# Patient Record
Sex: FEMALE | Race: WHITE | NOT HISPANIC OR LATINO | Employment: FULL TIME | URBAN - NONMETROPOLITAN AREA
[De-identification: names, ages, dates, MRNs, and addresses within clinical notes are randomized per-mention and may not be internally consistent; named-entity substitution may affect disease eponyms.]

---

## 2020-01-14 ENCOUNTER — TRANSCRIBE ORDERS (OUTPATIENT)
Dept: ADMINISTRATIVE | Facility: HOSPITAL | Age: 45
End: 2020-01-14

## 2020-01-14 ENCOUNTER — HOSPITAL ENCOUNTER (OUTPATIENT)
Dept: RADIOLOGY | Facility: HOSPITAL | Age: 45
Discharge: HOME | End: 2020-01-14
Payer: COMMERCIAL

## 2020-01-14 DIAGNOSIS — M25.552 PAIN IN LEFT HIP: Primary | ICD-10-CM

## 2020-01-14 DIAGNOSIS — M25.552 PAIN IN LEFT HIP: ICD-10-CM

## 2020-01-14 PROCEDURE — 73502 X-RAY EXAM HIP UNI 2-3 VIEWS: CPT

## 2020-08-06 ENCOUNTER — TRANSCRIBE ORDERS (OUTPATIENT)
Dept: ADMINISTRATIVE | Facility: HOSPITAL | Age: 45
End: 2020-08-06

## 2020-08-06 DIAGNOSIS — Z12.31 SCREENING MAMMOGRAM, ENCOUNTER FOR: Primary | ICD-10-CM

## 2020-08-20 ENCOUNTER — HOSPITAL ENCOUNTER (OUTPATIENT)
Dept: RADIOLOGY | Facility: CLINIC | Age: 45
Discharge: HOME | End: 2020-08-20
Payer: COMMERCIAL

## 2020-08-20 VITALS — BODY MASS INDEX: 34.55 KG/M2 | HEIGHT: 66 IN | WEIGHT: 215 LBS

## 2020-08-20 DIAGNOSIS — Z12.31 SCREENING MAMMOGRAM FOR HIGH-RISK PATIENT: ICD-10-CM

## 2020-08-20 DIAGNOSIS — Z12.31 SCREENING MAMMOGRAM, ENCOUNTER FOR: ICD-10-CM

## 2020-08-20 PROCEDURE — 77063 BREAST TOMOSYNTHESIS BI: CPT

## 2020-08-20 PROCEDURE — 77067 SCR MAMMO BI INCL CAD: CPT

## 2021-03-16 ENCOUNTER — TRANSCRIBE ORDERS (OUTPATIENT)
Dept: ADMINISTRATIVE | Facility: HOSPITAL | Age: 46
End: 2021-03-16

## 2021-03-16 ENCOUNTER — APPOINTMENT (OUTPATIENT)
Dept: LAB | Facility: HOSPITAL | Age: 46
End: 2021-03-16
Payer: COMMERCIAL

## 2021-03-16 DIAGNOSIS — R76.8 OTHER SPECIFIED ABNORMAL IMMUNOLOGICAL FINDINGS IN SERUM: ICD-10-CM

## 2021-03-16 DIAGNOSIS — R76.8 OTHER SPECIFIED ABNORMAL IMMUNOLOGICAL FINDINGS IN SERUM: Primary | ICD-10-CM

## 2021-03-16 LAB
ALT SERPL W P-5'-P-CCNC: 21 U/L (ref 12–78)
AST SERPL W P-5'-P-CCNC: 12 U/L (ref 5–45)
BACTERIA UR QL AUTO: ABNORMAL /HPF
BASOPHILS # BLD AUTO: 0.01 THOUSANDS/ΜL (ref 0–0.1)
BASOPHILS NFR BLD AUTO: 0 % (ref 0–1)
BILIRUB UR QL STRIP: NEGATIVE
CLARITY UR: CLEAR
COLOR UR: YELLOW
CREAT SERPL-MCNC: 0.9 MG/DL (ref 0.6–1.3)
CRP SERPL QL: 8.1 MG/L
EOSINOPHIL # BLD AUTO: 0.27 THOUSAND/ΜL (ref 0–0.61)
EOSINOPHIL NFR BLD AUTO: 4 % (ref 0–6)
ERYTHROCYTE [DISTWIDTH] IN BLOOD BY AUTOMATED COUNT: 14 % (ref 11.6–15.1)
ERYTHROCYTE [SEDIMENTATION RATE] IN BLOOD: 32 MM/HOUR (ref 0–19)
GFR SERPL CREATININE-BSD FRML MDRD: 76 ML/MIN/1.73SQ M
GLUCOSE UR STRIP-MCNC: NEGATIVE MG/DL
HCT VFR BLD AUTO: 36.5 % (ref 34.8–46.1)
HGB BLD-MCNC: 11.8 G/DL (ref 11.5–15.4)
HGB UR QL STRIP.AUTO: NEGATIVE
IMM GRANULOCYTES # BLD AUTO: 0.01 THOUSAND/UL (ref 0–0.2)
IMM GRANULOCYTES NFR BLD AUTO: 0 % (ref 0–2)
KETONES UR STRIP-MCNC: NEGATIVE MG/DL
LEUKOCYTE ESTERASE UR QL STRIP: NEGATIVE
LYMPHOCYTES # BLD AUTO: 2.02 THOUSANDS/ΜL (ref 0.6–4.47)
LYMPHOCYTES NFR BLD AUTO: 33 % (ref 14–44)
MCH RBC QN AUTO: 28.7 PG (ref 26.8–34.3)
MCHC RBC AUTO-ENTMCNC: 32.3 G/DL (ref 31.4–37.4)
MCV RBC AUTO: 89 FL (ref 82–98)
MONOCYTES # BLD AUTO: 0.38 THOUSAND/ΜL (ref 0.17–1.22)
MONOCYTES NFR BLD AUTO: 6 % (ref 4–12)
NEUTROPHILS # BLD AUTO: 3.52 THOUSANDS/ΜL (ref 1.85–7.62)
NEUTS SEG NFR BLD AUTO: 57 % (ref 43–75)
NITRITE UR QL STRIP: NEGATIVE
NON-SQ EPI CELLS URNS QL MICRO: ABNORMAL /HPF
NRBC BLD AUTO-RTO: 0 /100 WBCS
PH UR STRIP.AUTO: 5 [PH]
PLATELET # BLD AUTO: 320 THOUSANDS/UL (ref 149–390)
PMV BLD AUTO: 10.6 FL (ref 8.9–12.7)
PROT UR STRIP-MCNC: NEGATIVE MG/DL
RBC # BLD AUTO: 4.11 MILLION/UL (ref 3.81–5.12)
RBC #/AREA URNS AUTO: ABNORMAL /HPF
SP GR UR STRIP.AUTO: 1.02 (ref 1–1.03)
UROBILINOGEN UR QL STRIP.AUTO: 0.2 E.U./DL
WBC # BLD AUTO: 6.21 THOUSAND/UL (ref 4.31–10.16)
WBC #/AREA URNS AUTO: ABNORMAL /HPF

## 2021-03-16 PROCEDURE — 85732 THROMBOPLASTIN TIME PARTIAL: CPT

## 2021-03-16 PROCEDURE — 86235 NUCLEAR ANTIGEN ANTIBODY: CPT

## 2021-03-16 PROCEDURE — 81001 URINALYSIS AUTO W/SCOPE: CPT

## 2021-03-16 PROCEDURE — 84460 ALANINE AMINO (ALT) (SGPT): CPT

## 2021-03-16 PROCEDURE — 86140 C-REACTIVE PROTEIN: CPT

## 2021-03-16 PROCEDURE — 84450 TRANSFERASE (AST) (SGOT): CPT

## 2021-03-16 PROCEDURE — 86038 ANTINUCLEAR ANTIBODIES: CPT

## 2021-03-16 PROCEDURE — 85613 RUSSELL VIPER VENOM DILUTED: CPT

## 2021-03-16 PROCEDURE — 86146 BETA-2 GLYCOPROTEIN ANTIBODY: CPT

## 2021-03-16 PROCEDURE — 86225 DNA ANTIBODY NATIVE: CPT

## 2021-03-16 PROCEDURE — 86147 CARDIOLIPIN ANTIBODY EA IG: CPT

## 2021-03-16 PROCEDURE — 36415 COLL VENOUS BLD VENIPUNCTURE: CPT

## 2021-03-16 PROCEDURE — 86800 THYROGLOBULIN ANTIBODY: CPT

## 2021-03-16 PROCEDURE — 86039 ANTINUCLEAR ANTIBODIES (ANA): CPT

## 2021-03-16 PROCEDURE — 86226 DNA ANTIBODY SINGLE STRAND: CPT

## 2021-03-16 PROCEDURE — 85025 COMPLETE CBC W/AUTO DIFF WBC: CPT

## 2021-03-16 PROCEDURE — 85652 RBC SED RATE AUTOMATED: CPT

## 2021-03-16 PROCEDURE — 82565 ASSAY OF CREATININE: CPT

## 2021-03-16 PROCEDURE — 86376 MICROSOMAL ANTIBODY EACH: CPT

## 2021-03-16 PROCEDURE — 85670 THROMBIN TIME PLASMA: CPT

## 2021-03-16 PROCEDURE — 85705 THROMBOPLASTIN INHIBITION: CPT

## 2021-03-17 LAB
ANA HOMOGEN SER QL IF: NORMAL
ANA HOMOGEN TITR SER: NORMAL {TITER}
B2 GLYCOPROT1 IGA SERPL IA-ACNC: 3.4
B2 GLYCOPROT1 IGG SERPL IA-ACNC: 1.6
B2 GLYCOPROT1 IGM SERPL IA-ACNC: <2.9
CARDIOLIPIN IGA SER IA-ACNC: 9.1
CARDIOLIPIN IGG SER IA-ACNC: 7.2
CARDIOLIPIN IGM SER IA-ACNC: 2.8
CENTROMERE B AB SER-ACNC: <0.2 AI (ref 0–0.9)
DSDNA AB SER-ACNC: 1 IU/ML (ref 0–9)
ENA RNP AB SER-ACNC: <0.2 AI (ref 0–0.9)
ENA SCL70 AB SER-ACNC: <0.2 AI (ref 0–0.9)
ENA SM AB SER-ACNC: <0.2 AI (ref 0–0.9)
ENA SS-A AB SER-ACNC: <0.2 AI (ref 0–0.9)
ENA SS-B AB SER-ACNC: <0.2 AI (ref 0–0.9)
RYE IGE QN: POSITIVE
THYROGLOB AB SERPL-ACNC: <1 IU/ML (ref 0–0.9)
THYROPEROXIDASE AB SERPL-ACNC: <9 IU/ML (ref 0–34)

## 2021-03-18 LAB — HISTONE IGG SER IA-ACNC: 1.4 UNITS (ref 0–0.9)

## 2021-03-19 LAB
APTT SCREEN TO CONFIRM RATIO: 1.17 RATIO (ref 0–1.4)
CONFIRM APTT/NORMAL: 35.8 SEC (ref 0–55)
LA PPP-IMP: NORMAL
SCREEN APTT: 38.2 SEC (ref 0–51.9)
SCREEN DRVVT: 39.9 SEC (ref 0–47)
SSDNA IGG SER-ACNC: 27 EU (ref 0–19)
THROMBIN TIME: 17 SEC (ref 0–23)

## 2021-03-24 LAB — MISCELLANEOUS LAB TEST RESULT: NORMAL

## 2021-04-08 DIAGNOSIS — Z23 ENCOUNTER FOR IMMUNIZATION: ICD-10-CM

## 2022-01-06 ENCOUNTER — HOSPITAL ENCOUNTER (OUTPATIENT)
Dept: RADIOLOGY | Facility: CLINIC | Age: 47
Discharge: HOME | End: 2022-01-06
Payer: COMMERCIAL

## 2022-01-06 VITALS — BODY MASS INDEX: 36.32 KG/M2 | WEIGHT: 226 LBS | HEIGHT: 66 IN

## 2022-01-06 DIAGNOSIS — Z12.31 ENCOUNTER FOR SCREENING MAMMOGRAM FOR MALIGNANT NEOPLASM OF BREAST: ICD-10-CM

## 2022-01-06 PROCEDURE — 77063 BREAST TOMOSYNTHESIS BI: CPT

## 2022-01-06 PROCEDURE — 77067 SCR MAMMO BI INCL CAD: CPT

## 2022-01-26 ENCOUNTER — HOSPITAL ENCOUNTER (OUTPATIENT)
Dept: RADIOLOGY | Facility: CLINIC | Age: 47
Discharge: HOME | End: 2022-01-26
Payer: COMMERCIAL

## 2022-01-26 VITALS — WEIGHT: 226 LBS | BODY MASS INDEX: 36.32 KG/M2 | HEIGHT: 66 IN

## 2022-01-26 DIAGNOSIS — R92.8 ABNORMAL SCREENING MAMMOGRAM: ICD-10-CM

## 2022-01-26 PROCEDURE — 76642 ULTRASOUND BREAST LIMITED: CPT

## 2022-01-26 PROCEDURE — 77065 DX MAMMO INCL CAD UNI: CPT

## 2022-01-26 PROCEDURE — G0279 TOMOSYNTHESIS, MAMMO: HCPCS

## 2022-07-27 ENCOUNTER — HOSPITAL ENCOUNTER (OUTPATIENT)
Dept: RADIOLOGY | Facility: CLINIC | Age: 47
Discharge: HOME | End: 2022-07-27
Payer: COMMERCIAL

## 2022-07-27 VITALS — BODY MASS INDEX: 36.32 KG/M2 | HEIGHT: 66 IN | WEIGHT: 226 LBS

## 2022-07-27 DIAGNOSIS — R92.8 ABNORMAL FINDINGS ON DIAGNOSTIC IMAGING OF BREAST: ICD-10-CM

## 2022-07-27 PROCEDURE — G0279 TOMOSYNTHESIS, MAMMO: HCPCS

## 2022-07-27 PROCEDURE — 76642 ULTRASOUND BREAST LIMITED: CPT

## 2022-07-27 PROCEDURE — 77065 DX MAMMO INCL CAD UNI: CPT

## 2023-01-25 ENCOUNTER — HOSPITAL ENCOUNTER (OUTPATIENT)
Dept: RADIOLOGY | Facility: CLINIC | Age: 48
Discharge: HOME | End: 2023-01-25

## 2023-01-25 VITALS — WEIGHT: 226 LBS | HEIGHT: 66 IN | BODY MASS INDEX: 36.32 KG/M2

## 2023-01-25 DIAGNOSIS — M75.51 BURSITIS OF RIGHT SHOULDER: ICD-10-CM

## 2023-01-25 DIAGNOSIS — R92.8 ABNORMAL MAMMOGRAM: ICD-10-CM

## 2023-03-29 ENCOUNTER — HOSPITAL ENCOUNTER (OUTPATIENT)
Dept: ULTRASOUND IMAGING | Facility: HOSPITAL | Age: 48
Discharge: HOME | End: 2023-03-29
Attending: OBSTETRICS & GYNECOLOGY

## 2023-03-29 DIAGNOSIS — N93.9 ABNORMAL UTERINE AND VAGINAL BLEEDING, UNSPECIFIED: ICD-10-CM

## 2023-05-30 ENCOUNTER — APPOINTMENT (OUTPATIENT)
Dept: LAB | Facility: HOSPITAL | Age: 48
End: 2023-05-30
Payer: COMMERCIAL

## 2023-05-30 DIAGNOSIS — Z01.818 PRE-OP EXAM: ICD-10-CM

## 2023-05-30 DIAGNOSIS — Z01.818 PRE-OP EXAM: Primary | ICD-10-CM

## 2023-05-30 LAB
ERYTHROCYTE [DISTWIDTH] IN BLOOD BY AUTOMATED COUNT: 15.7 % (ref 11.6–15.1)
HCT VFR BLD AUTO: 36.1 % (ref 34.8–46.1)
HGB BLD-MCNC: 11.1 G/DL (ref 11.5–15.4)
MCH RBC QN AUTO: 26.6 PG (ref 26.8–34.3)
MCHC RBC AUTO-ENTMCNC: 30.7 G/DL (ref 31.4–37.4)
MCV RBC AUTO: 86 FL (ref 82–98)
PLATELET # BLD AUTO: 396 THOUSANDS/UL (ref 149–390)
PMV BLD AUTO: 10.4 FL (ref 8.9–12.7)
RBC # BLD AUTO: 4.18 MILLION/UL (ref 3.81–5.12)
WBC # BLD AUTO: 8.63 THOUSAND/UL (ref 4.31–10.16)

## 2023-05-30 PROCEDURE — 85027 COMPLETE CBC AUTOMATED: CPT

## 2023-05-30 PROCEDURE — 36415 COLL VENOUS BLD VENIPUNCTURE: CPT

## 2023-05-30 RX ORDER — FLUTICASONE PROPIONATE 50 MCG
1 SPRAY, SUSPENSION (ML) NASAL 2 TIMES DAILY
COMMUNITY

## 2023-05-30 RX ORDER — FEXOFENADINE HCL 180 MG/1
180 TABLET ORAL DAILY
COMMUNITY

## 2023-05-30 RX ORDER — ASCORBIC ACID 500 MG
500 TABLET ORAL DAILY
COMMUNITY

## 2023-05-30 RX ORDER — LEVOTHYROXINE SODIUM 112 UG/1
112 TABLET ORAL DAILY
COMMUNITY

## 2023-05-30 RX ORDER — METOPROLOL SUCCINATE 50 MG/1
50 TABLET, EXTENDED RELEASE ORAL DAILY
COMMUNITY

## 2023-05-30 RX ORDER — ACETAMINOPHEN, ASPIRIN AND CAFFEINE 250; 250; 65 MG/1; MG/1; MG/1
1 TABLET, FILM COATED ORAL EVERY 6 HOURS PRN
COMMUNITY

## 2023-05-30 RX ORDER — DULOXETIN HYDROCHLORIDE 20 MG/1
20 CAPSULE, DELAYED RELEASE ORAL 2 TIMES DAILY
COMMUNITY

## 2023-05-30 RX ORDER — ALPRAZOLAM 0.25 MG/1
0.25 TABLET ORAL 2 TIMES DAILY PRN
COMMUNITY

## 2023-05-30 RX ORDER — DIPHENHYDRAMINE HCL 25 MG
25 CAPSULE ORAL EVERY 6 HOURS PRN
COMMUNITY

## 2023-05-30 RX ORDER — PRAVASTATIN SODIUM 10 MG
10 TABLET ORAL DAILY
COMMUNITY

## 2023-05-30 RX ORDER — OMEPRAZOLE 20 MG/1
20 CAPSULE, DELAYED RELEASE ORAL DAILY
COMMUNITY

## 2023-05-30 RX ORDER — ACETAMINOPHEN 500 MG
1000 TABLET ORAL EVERY 6 HOURS PRN
COMMUNITY

## 2023-05-30 NOTE — PRE-PROCEDURE INSTRUCTIONS
Pre-Surgery Instructions:   Medication Instructions   • acetaminophen (TYLENOL) 500 mg tablet Uses PRN- OK to take day of surgery   • ALPRAZolam (XANAX) 0 25 mg tablet Uses PRN- OK to take day of surgery   • ascorbic acid (VITAMIN C) 500 MG tablet Stop taking 5 days prior to surgery  • aspirin-acetaminophen-caffeine (EXCEDRIN MIGRAINE) 524-815-92 MG per tablet Stop taking 5 days prior to surgery  • Cholecalciferol (Vitamin D3) 125 MCG (5000 UT) TABS Stop taking 5 days prior to surgery  • Cyanocobalamin (VITAMIN B-12 PO) Stop taking 5 days prior to surgery  • diphenhydrAMINE (BENADRYL) 25 mg capsule Uses PRN- OK to take day of surgery   • DULoxetine (CYMBALTA) 20 mg capsule Take day of surgery  • fexofenadine (ALLEGRA) 180 MG tablet Take day of surgery  • fluticasone (FLONASE) 50 mcg/act nasal spray Take day of surgery  • levothyroxine 112 mcg tablet Take day of surgery  • metoprolol succinate (TOPROL-XL) 50 mg 24 hr tablet Take night before surgery   • Omega-3 Fatty Acids (FISH OIL PO) Stop taking 5 days prior to surgery  • omeprazole (PriLOSEC) 20 mg delayed release capsule Take night before surgery   • pravastatin (PRAVACHOL) 10 mg tablet Take night before surgery   See above      Haven Gutiérrez   Social History     Socioeconomic History   • Marital status: Single     Spouse name: Not on file   • Number of children: Not on file   • Years of education: Not on file   • Highest education level: Not on file   Occupational History   • Not on file   Tobacco Use   • Smoking status: Not on file   • Smokeless tobacco: Not on file   Substance and Sexual Activity   • Alcohol use: Not on file   • Drug use: Not on file   • Sexual activity: Not on file   Other Topics Concern   • Not on file   Social History Narrative   • Not on file     Social Determinants of Health     Financial Resource Strain: Not on file   Food Insecurity: Not on file   Transportation Needs: Not on file   Physical Activity: Not on file   Stress: Not on file   Social Connections: Not on file   Intimate Partner Violence: Not on file   Housing Stability: Not on file

## 2023-05-31 RX ORDER — ONDANSETRON 2 MG/ML
4 INJECTION INTRAMUSCULAR; INTRAVENOUS EVERY 6 HOURS PRN
Status: CANCELLED | OUTPATIENT
Start: 2023-05-31

## 2023-05-31 RX ORDER — IBUPROFEN 600 MG/1
600 TABLET ORAL EVERY 6 HOURS PRN
Status: CANCELLED | OUTPATIENT
Start: 2023-05-31

## 2023-05-31 RX ORDER — SODIUM CHLORIDE 9 MG/ML
125 INJECTION, SOLUTION INTRAVENOUS CONTINUOUS
Status: CANCELLED | OUTPATIENT
Start: 2023-05-31

## 2023-05-31 NOTE — H&P
HISTORY AND PHYSICAL       Subjective      Clemencia Gates is a 50year old female  Chief Complaint   Patient presents with   • Consultation            HPI:     This is a 57-year-old  presents the office today for a consultation visit  Patient is known to to [facility]  Patient had 1 year of menopause from  until   Patient presented today at her gyn office complaining of abdominal pain  Ultimately had hematometra , and drainage was done in the office  Biopsy unable to be obtained due to stenotic cervix     Per the patient, has had multiple abnormal Pap smears, and precervical cancer in the past   She has had multiple colposcopies, leaps, and laser ablation performed      Here for consultation because she needs a hysteroscopy, D&C for abnormal uterine bleeding and endometrial sampling        PMH:     No past medical history on file  No past surgical history on file     No family history on file             Current Outpatient Medications   Medication Sig Dispense Refill   • CELEBREX 200 MG OR CAPS 2 times daily 60 5   • ALPRAZolam 0 25 MG Oral Tablet (xaNAX) alprazolam 0 25 mg tablet       • Ascorbic Acid 500 MG Oral Tablet Chewable Take by mouth        • Celecoxib 100 MG Oral Capsule (CeleBREX)         • Cyanocobalamin ER 1000 MCG Oral Tablet Extended Release Take by mouth        • diphenhydrAMINE HCl 25 MG Oral Tablet         • DULoxetine HCl 20 MG Oral Capsule Delayed Release Particles (Cymbalta)         • Fexofenadine HCl 180 MG Oral Tablet (Allegra) Take 1 Tablet by mouth        • Fluticasone Propionate 50 MCG/ACT Nasal Suspension (Flonase) fluticasone propionate 50 mcg/actuation nasal spray,suspension   as directed       • Levothyroxine Sodium 112 MCG Oral Tablet (Levoxyl)         • Metoprolol Succinate ER 50 MG Oral Tablet Extended Release 24 Hour (toPROL XL)         • Omeprazole 20 MG Oral Capsule Delayed Release (PriLOSEC)         • Pravastatin Sodium 10 MG Oral Tablet "(Pravachol)            No current facility-administered medications for this visit                Review of patient's allergies indicates: Allergen Reactions   • Iodine Hives       Cat scan    • Penicillins           ROS:  Review of Systems   Constitutional: Negative for activity change, appetite change, fatigue and fever  HENT: Negative for congestion, facial swelling, hearing loss, sinus pressure and sinus pain  Eyes: Negative for discharge and itching  Respiratory: Negative for apnea and chest tightness  Cardiovascular: Negative for chest pain and leg swelling  Gastrointestinal: Negative for abdominal distention, abdominal pain and nausea  Endocrine: Negative for cold intolerance and heat intolerance  Genitourinary:  Positive for irregular bleeding  Musculoskeletal: Negative for arthralgias, back pain and gait problem  Skin: Negative for color change and pallor  Allergic/Immunologic: Negative for environmental allergies  Neurological: Negative for dizziness, facial asymmetry and numbness  Psychiatric/Behavioral: Negative for agitation, behavioral problems and suicidal ideas                   Objective      /74 (BP Site: Left Arm, BP Position: Sitting, BP Cuff Size: Large)   Pulse 108   Temp 36 5 °C (97 7 °F) (Infrared )   Resp 16   Ht 1 676 m (5' 6\")   Wt 105 7 kg (233 lb)   LMP 02/28/2022 (Approximate)   SpO2 98%   BMI 37 61 kg/m²   BSA 2 22 m²      Physical Exam:  Physical Exam  Constitutional:       General: She is not in acute distress  Appearance: She is not ill-appearing or toxic-appearing  HENT:      Head: Normocephalic and atraumatic  Nose: Nose normal       Mouth/Throat:      Mouth: Mucous membranes are moist      Eyes:      Conjunctiva/sclera: Conjunctivae normal       Pupils: Pupils are equal, round, and reactive to light  Cardiovascular:      Rate and Rhythm: Normal rate and regular rhythm  Pulmonary:      Effort: No respiratory distress   " Breath sounds: Normal breath sounds  Abdominal:      Palpations: Abdomen is soft  Tenderness: There is no abdominal tenderness  There is no guarding       Genitourinary:     General: Normal vulva  Vagina: No vaginal discharge  External exam:  No rashes, lumps or lesions noted  Internal exam:   No cervical motion tenderness  Anteverted uterus  Negative for adnexal tenderness  No adnexal masses palpated  No vaginal bleeding noted  No abnormal discharge noted  Very small and extremely stenotic cervix       Musculoskeletal:         General: No swelling or tenderness       Neurological:      Mental Status: She is alert and oriented to person, place, and time  Psychiatric:         Mood and Affect: Mood normal          Behavior: Behavior normal       Extremities: no swelling or pain              ASSESSMENT/PLAN:     Faby Watters was seen today for consultation      Diagnoses and all orders for this visit:     Abnormal uterine bleeding (AUB)  -     US PELVIS TRANS-VAGINAL NON-OB     Pre-op testing     1  Abnormal uterine bleeding  Patient was consented in the office today for a hysteroscopy, D& C  Realistically due to her stenotic cervix, would probably just proceed with D&C  Risks, benefits alternatives were discussed  All questions were answered    Consents were signed  Will also obtain transvaginal ultrasound to get a better picture of the pelvis

## 2023-06-01 ENCOUNTER — ANESTHESIA EVENT (OUTPATIENT)
Dept: PERIOP | Facility: HOSPITAL | Age: 48
End: 2023-06-01

## 2023-06-01 NOTE — ANESTHESIA PREPROCEDURE EVALUATION
Procedure:  HYSTEROSCOPY, DILATION AND CURETTAGE, POSSIBLE POLYPECTOMY (Uterus)    Relevant Problems   No relevant active problems      Hypothyroid    GERD    SVT      Physical Exam    Airway    Mallampati score: II  TM Distance: >3 FB  Neck ROM: full     Dental   No notable dental hx     Cardiovascular  Cardiovascular exam normal    Pulmonary  Pulmonary exam normal     Other Findings        Anesthesia Plan  ASA Score- 2     Anesthesia Type- general with ASA Monitors  Additional Monitors:   Airway Plan: LMA  Plan Factors-Exercise tolerance (METS): >4 METS  Chart reviewed  EKG reviewed  Imaging results reviewed  Existing labs reviewed  Patient summary reviewed  Patient is not a current smoker  Patient not instructed to abstain from smoking on day of procedure  Patient did not smoke on day of surgery  Obstructive sleep apnea risk education given perioperatively  Induction- intravenous  Postoperative Plan-     Informed Consent- Anesthetic plan and risks discussed with patient  I personally reviewed this patient with the CRNA  Discussed and agreed on the Anesthesia Plan with the CRNA  Zack Deleon

## 2023-06-02 ENCOUNTER — ANESTHESIA (OUTPATIENT)
Dept: PERIOP | Facility: HOSPITAL | Age: 48
End: 2023-06-02

## 2023-06-02 ENCOUNTER — HOSPITAL ENCOUNTER (OUTPATIENT)
Facility: HOSPITAL | Age: 48
Setting detail: OUTPATIENT SURGERY
Discharge: HOME | End: 2023-06-02
Attending: OBSTETRICS & GYNECOLOGY | Admitting: OBSTETRICS & GYNECOLOGY
Payer: COMMERCIAL

## 2023-06-02 VITALS
WEIGHT: 231 LBS | OXYGEN SATURATION: 95 % | SYSTOLIC BLOOD PRESSURE: 111 MMHG | RESPIRATION RATE: 18 BRPM | HEIGHT: 66 IN | BODY MASS INDEX: 37.12 KG/M2 | HEART RATE: 80 BPM | TEMPERATURE: 97.8 F | DIASTOLIC BLOOD PRESSURE: 67 MMHG

## 2023-06-02 DIAGNOSIS — N93.9 ABNORMAL UTERINE AND VAGINAL BLEEDING, UNSPECIFIED: ICD-10-CM

## 2023-06-02 LAB
EXT PREGNANCY TEST URINE: NEGATIVE
EXT. CONTROL: NORMAL

## 2023-06-02 PROCEDURE — 88305 TISSUE EXAM BY PATHOLOGIST: CPT | Performed by: PATHOLOGY

## 2023-06-02 PROCEDURE — 81025 URINE PREGNANCY TEST: CPT | Performed by: OBSTETRICS & GYNECOLOGY

## 2023-06-02 RX ORDER — GLYCOPYRROLATE 0.2 MG/ML
INJECTION INTRAMUSCULAR; INTRAVENOUS AS NEEDED
Status: DISCONTINUED | OUTPATIENT
Start: 2023-06-02 | End: 2023-06-02

## 2023-06-02 RX ORDER — SODIUM CHLORIDE, SODIUM LACTATE, POTASSIUM CHLORIDE, CALCIUM CHLORIDE 600; 310; 30; 20 MG/100ML; MG/100ML; MG/100ML; MG/100ML
INJECTION, SOLUTION INTRAVENOUS CONTINUOUS PRN
Status: DISCONTINUED | OUTPATIENT
Start: 2023-06-02 | End: 2023-06-02

## 2023-06-02 RX ORDER — PROPOFOL 10 MG/ML
INJECTION, EMULSION INTRAVENOUS AS NEEDED
Status: DISCONTINUED | OUTPATIENT
Start: 2023-06-02 | End: 2023-06-02

## 2023-06-02 RX ORDER — CLINDAMYCIN PHOSPHATE 900 MG/50ML
900 INJECTION INTRAVENOUS ONCE
Status: COMPLETED | OUTPATIENT
Start: 2023-06-02 | End: 2023-06-02

## 2023-06-02 RX ORDER — MIDAZOLAM HYDROCHLORIDE 2 MG/2ML
INJECTION, SOLUTION INTRAMUSCULAR; INTRAVENOUS AS NEEDED
Status: DISCONTINUED | OUTPATIENT
Start: 2023-06-02 | End: 2023-06-02

## 2023-06-02 RX ORDER — FENTANYL CITRATE/PF 50 MCG/ML
50 SYRINGE (ML) INJECTION
Status: CANCELLED | OUTPATIENT
Start: 2023-06-02

## 2023-06-02 RX ORDER — FENTANYL CITRATE 50 UG/ML
INJECTION, SOLUTION INTRAMUSCULAR; INTRAVENOUS AS NEEDED
Status: DISCONTINUED | OUTPATIENT
Start: 2023-06-02 | End: 2023-06-02

## 2023-06-02 RX ORDER — METOPROLOL TARTRATE 5 MG/5ML
INJECTION INTRAVENOUS AS NEEDED
Status: DISCONTINUED | OUTPATIENT
Start: 2023-06-02 | End: 2023-06-02

## 2023-06-02 RX ORDER — PROPOFOL 10 MG/ML
INJECTION, EMULSION INTRAVENOUS CONTINUOUS PRN
Status: DISCONTINUED | OUTPATIENT
Start: 2023-06-02 | End: 2023-06-02

## 2023-06-02 RX ORDER — ONDANSETRON 2 MG/ML
INJECTION INTRAMUSCULAR; INTRAVENOUS AS NEEDED
Status: DISCONTINUED | OUTPATIENT
Start: 2023-06-02 | End: 2023-06-02

## 2023-06-02 RX ORDER — LIDOCAINE HYDROCHLORIDE 10 MG/ML
INJECTION, SOLUTION EPIDURAL; INFILTRATION; INTRACAUDAL; PERINEURAL AS NEEDED
Status: DISCONTINUED | OUTPATIENT
Start: 2023-06-02 | End: 2023-06-02

## 2023-06-02 RX ORDER — ACETAMINOPHEN 325 MG/1
975 TABLET ORAL ONCE
Status: COMPLETED | OUTPATIENT
Start: 2023-06-02 | End: 2023-06-02

## 2023-06-02 RX ORDER — SODIUM CHLORIDE 9 MG/ML
125 INJECTION, SOLUTION INTRAVENOUS CONTINUOUS
Status: DISCONTINUED | OUTPATIENT
Start: 2023-06-02 | End: 2023-06-02 | Stop reason: HOSPADM

## 2023-06-02 RX ORDER — DEXAMETHASONE SODIUM PHOSPHATE 10 MG/ML
INJECTION, SOLUTION INTRAMUSCULAR; INTRAVENOUS AS NEEDED
Status: DISCONTINUED | OUTPATIENT
Start: 2023-06-02 | End: 2023-06-02

## 2023-06-02 RX ORDER — KETOROLAC TROMETHAMINE 30 MG/ML
INJECTION, SOLUTION INTRAMUSCULAR; INTRAVENOUS AS NEEDED
Status: DISCONTINUED | OUTPATIENT
Start: 2023-06-02 | End: 2023-06-02

## 2023-06-02 RX ORDER — FENTANYL CITRATE/PF 50 MCG/ML
50 SYRINGE (ML) INJECTION
Status: DISCONTINUED | OUTPATIENT
Start: 2023-06-02 | End: 2023-06-02 | Stop reason: HOSPADM

## 2023-06-02 RX ADMIN — FENTANYL CITRATE 50 MCG: 0.05 INJECTION, SOLUTION INTRAMUSCULAR; INTRAVENOUS at 07:58

## 2023-06-02 RX ADMIN — ONDANSETRON 4 MG: 2 INJECTION INTRAMUSCULAR; INTRAVENOUS at 07:32

## 2023-06-02 RX ADMIN — PROPOFOL 200 MG: 10 INJECTION, EMULSION INTRAVENOUS at 07:32

## 2023-06-02 RX ADMIN — GLYCOPYRROLATE 0.2 MG: 0.2 INJECTION, SOLUTION INTRAMUSCULAR; INTRAVENOUS at 07:26

## 2023-06-02 RX ADMIN — ACETAMINOPHEN 975 MG: 325 TABLET ORAL at 07:23

## 2023-06-02 RX ADMIN — KETOROLAC TROMETHAMINE 30 MG: 30 INJECTION, SOLUTION INTRAMUSCULAR; INTRAVENOUS at 07:59

## 2023-06-02 RX ADMIN — FENTANYL CITRATE 50 MCG: 0.05 INJECTION, SOLUTION INTRAMUSCULAR; INTRAVENOUS at 07:37

## 2023-06-02 RX ADMIN — MIDAZOLAM 2 MG: 1 INJECTION INTRAMUSCULAR; INTRAVENOUS at 07:26

## 2023-06-02 RX ADMIN — PROPOFOL 150 MCG/KG/MIN: 10 INJECTION, EMULSION INTRAVENOUS at 07:32

## 2023-06-02 RX ADMIN — DEXAMETHASONE SODIUM PHOSPHATE 10 MG: 10 INJECTION, SOLUTION INTRAMUSCULAR; INTRAVENOUS at 07:32

## 2023-06-02 RX ADMIN — SODIUM CHLORIDE, SODIUM LACTATE, POTASSIUM CHLORIDE, AND CALCIUM CHLORIDE: .6; .31; .03; .02 INJECTION, SOLUTION INTRAVENOUS at 07:28

## 2023-06-02 RX ADMIN — METOROPROLOL TARTRATE 1 MG: 5 INJECTION, SOLUTION INTRAVENOUS at 08:00

## 2023-06-02 RX ADMIN — LIDOCAINE HYDROCHLORIDE 50 MG: 10 INJECTION, SOLUTION EPIDURAL; INFILTRATION; INTRACAUDAL; PERINEURAL at 07:32

## 2023-06-02 RX ADMIN — CLINDAMYCIN PHOSPHATE 900 MG: 900 INJECTION, SOLUTION INTRAVENOUS at 07:32

## 2023-06-02 RX ADMIN — GENTAMICIN SULFATE 116.4 MG: 40 INJECTION, SOLUTION INTRAMUSCULAR; INTRAVENOUS at 07:12

## 2023-06-02 NOTE — ANESTHESIA POSTPROCEDURE EVALUATION
Post-Op Assessment Note    CV Status:  Stable  Pain Score: 0    Pain management: adequate  Multimodal analgesia used between 6 hours prior to anesthesia start to PACU discharge    Mental Status:  Awake   Hydration Status:  Euvolemic and stable   PONV Controlled:  None   Airway Patency:  Patent   Two or more mitigation strategies used for obstructive sleep apnea   Post Op Vitals Reviewed: Yes      Staff: CRNA         No notable events documented      BP   118/71   Temp   96 9   Pulse  91   Resp   21   SpO2   97

## 2023-06-02 NOTE — OP NOTE
OPERATIVE REPORT  PATIENT NAME: Constance Thomas    :  1975  MRN: [id number]  Pt Location: [facility] OR ROOM 02    SURGERY DATE: 2023    Surgeon(s) and Role:     * Toñito Alejandro DO - Primary    Preop Diagnosis:  Abnormal uterine and vaginal bleeding, unspecified [N93 9]    Post-Op Diagnosis Codes:     * Abnormal uterine and vaginal bleeding, unspecified [N93 9]    Procedure(s):  DILATION AND CURETTAGE  EUA    Specimen(s):  ID Type Source Tests Collected by Time Destination   1 : endometrial curettings Tissue Endometrium TISSUE EXAM Toñito Alejandro DO 2023  7:48 AM        Estimated Blood Loss:   Minimal    Drains:   No LDAs found     Anesthesia Type:   General    Operative Indications:  Abnormal uterine and vaginal bleeding, unspecified [N93 9]      Operative Findings:  Extremely stenotic cervix  Due to multiple previous cervical surgeries, very little to no cervical tissue left  Very difficult to identify cervix and cervical os    Complications:   None    Procedure and Technique:  The patient was taken to the operating room where general anesthesia was administered without difficulty  The patient was placed in the dorsolithotomy position with stirrups  An exam under anesthesia revealed a normal anteverted uterus  The patient was then prepared and draped in the normal sterile fashion  A weighted speculum was inserted into the posterior aspect of the vagina  Due to patient's previous surgeries, she had very little to no cervical tissue that was able to be visualized  Did not have a well-defined cervical os  Once we found what appeared to be a cervix, grasped with single-tooth tenaculum  Carefully rounded  She was sounded to approximately 7 cm which does match up with her ultrasound back in March  Due to concern of not being in the proper area we decided to perform a D&C only  The uterus was curetted in a clockwise fashion  The scrapings were then sent to pathology  The tenaculum was removed from the cervix and good hemostasis and very little bleeding was noted  The patient tolerated procedure well  The instrument and sponge counts were correct x2  The patient was awakened from general anesthesia and taken to the recovery room in a stable condition  The patient will go home after recovery from anesthesia and meeting all the criteria for discharge  She is given instruction regarding a follow visit in two weeks in the office      Patient Disposition:  PACU         SIGNATURE: Louis Ruffin DO  DATE: June 2, 2023  TIME: 8:12 AM

## 2023-06-02 NOTE — DISCHARGE SUMMARY
Discharge Summary - Zhane Reyna 50 y o  female MRN: [id number]    Unit/Bed#: OR POOL Encounter: [id number]    Admission Date:     Admitting Diagnosis: Abnormal uterine and vaginal bleeding, unspecified [N93 9]      Procedures Performed: Exam under anesthesia, D&C    Summary of Hospital Course: Patient was here for scheduled surgery  No issues and discharged home the same day    Significant Findings, Care, Treatment and Services Provided: Very stenotic cervix  Very little cervical tissue visualized on exam    Complications: Difficulty with exam and dilatation due to previous cervical surgeries    Discharge Diagnosis: Abnormal uterine bleeding stenotic cervix    Medical Problems        Condition at Discharge: good         Discharge instructions/Information to patient and family:   See after visit summary for information provided to patient and family  Provisions for Follow-Up Care:  See after visit summary for information related to follow-up care and any pertinent home health orders  PCP: Niurka Aguilera DO    Disposition: Home    Planned Readmission: No      Discharge Statement   I spent 15 minutes discharging the patient  This time was spent on the day of discharge  I had direct contact with the patient on the day of discharge  Additional documentation is required if more than 30 minutes were spent on discharge  Discharge Medications:  See after visit summary for reconciled discharge medications provided to patient and family

## 2023-06-02 NOTE — DISCHARGE INSTR - AVS FIRST PAGE
Please take Tylenol and Advil as needed  No lifting, pushing or pulling more than 30 lb for 3 days  No sex, tampons or douching until you see me in the office  Please make an appointment to see me in the office in 3 weeks  Please call with any abnormal discharge like pus  Fevers, etc   Regular diet    May shower today

## 2023-06-02 NOTE — INTERVAL H&P NOTE
H&P reviewed  After examining the patient I find no changes in the patients condition since the H&P had been written      Vitals:    06/02/23 0648   BP: 119/65   Pulse: 77   Resp: 18   Temp: 97 7 °F (36 5 °C)   SpO2: 96%

## 2023-06-08 PROCEDURE — 88305 TISSUE EXAM BY PATHOLOGIST: CPT | Performed by: PATHOLOGY

## 2023-11-20 ENCOUNTER — HOSPITAL ENCOUNTER (OUTPATIENT)
Dept: ULTRASOUND IMAGING | Facility: HOSPITAL | Age: 48
Discharge: HOME | End: 2023-11-20
Payer: COMMERCIAL

## 2023-11-20 DIAGNOSIS — N93.9 ABNORMAL VAGINAL BLEEDING: ICD-10-CM

## 2023-11-20 PROCEDURE — 76856 US EXAM PELVIC COMPLETE: CPT

## 2023-11-20 PROCEDURE — 76830 TRANSVAGINAL US NON-OB: CPT

## 2024-03-06 ENCOUNTER — HOSPITAL ENCOUNTER (OUTPATIENT)
Dept: RADIOLOGY | Facility: CLINIC | Age: 49
Discharge: HOME | End: 2024-03-06
Payer: COMMERCIAL

## 2024-03-06 VITALS — BODY MASS INDEX: 39.49 KG/M2 | HEIGHT: 65 IN | WEIGHT: 237 LBS

## 2024-03-06 DIAGNOSIS — R92.8 ABNORMAL MAMMOGRAM: ICD-10-CM

## 2024-03-06 PROCEDURE — 77066 DX MAMMO INCL CAD BI: CPT

## 2024-03-06 PROCEDURE — G0279 TOMOSYNTHESIS, MAMMO: HCPCS

## 2024-03-29 ENCOUNTER — HOSPITAL ENCOUNTER (OUTPATIENT)
Dept: RADIOLOGY | Facility: HOSPITAL | Age: 49
End: 2024-03-29
Payer: COMMERCIAL

## 2024-03-29 DIAGNOSIS — R06.00 DYSPNEA, UNSPECIFIED TYPE: ICD-10-CM

## 2024-03-29 PROCEDURE — 71046 X-RAY EXAM CHEST 2 VIEWS: CPT

## 2024-04-15 ENCOUNTER — APPOINTMENT (EMERGENCY)
Dept: CT IMAGING | Facility: HOSPITAL | Age: 49
End: 2024-04-15
Payer: COMMERCIAL

## 2024-04-15 ENCOUNTER — APPOINTMENT (EMERGENCY)
Dept: NON INVASIVE DIAGNOSTICS | Facility: HOSPITAL | Age: 49
End: 2024-04-15
Payer: COMMERCIAL

## 2024-04-15 ENCOUNTER — HOSPITAL ENCOUNTER (EMERGENCY)
Facility: HOSPITAL | Age: 49
Discharge: HOME | End: 2024-04-15
Attending: STUDENT IN AN ORGANIZED HEALTH CARE EDUCATION/TRAINING PROGRAM
Payer: COMMERCIAL

## 2024-04-15 ENCOUNTER — APPOINTMENT (EMERGENCY)
Dept: RADIOLOGY | Facility: HOSPITAL | Age: 49
End: 2024-04-15
Payer: COMMERCIAL

## 2024-04-15 VITALS
TEMPERATURE: 98.1 F | SYSTOLIC BLOOD PRESSURE: 121 MMHG | HEIGHT: 65 IN | OXYGEN SATURATION: 91 % | BODY MASS INDEX: 37.49 KG/M2 | DIASTOLIC BLOOD PRESSURE: 76 MMHG | RESPIRATION RATE: 20 BRPM | HEART RATE: 114 BPM | WEIGHT: 225 LBS

## 2024-04-15 DIAGNOSIS — I31.9 PERICARDITIS: ICD-10-CM

## 2024-04-15 DIAGNOSIS — R07.89 ATYPICAL CHEST PAIN: Primary | ICD-10-CM

## 2024-04-15 DIAGNOSIS — R91.1 LUNG NODULE: ICD-10-CM

## 2024-04-15 LAB
ALBUMIN SERPL BCP-MCNC: 4.4 G/DL (ref 3.5–5)
ALP SERPL-CCNC: 89 U/L (ref 34–104)
ALT SERPL W P-5'-P-CCNC: 19 U/L (ref 7–52)
ANION GAP SERPL CALCULATED.3IONS-SCNC: 11 MMOL/L (ref 4–13)
AORTIC ROOT: 2.7 CM
APICAL FOUR CHAMBER EJECTION FRACTION: 61 %
AST SERPL W P-5'-P-CCNC: 21 U/L (ref 13–39)
ATRIAL RATE: 100 BPM
ATRIAL RATE: 114 BPM
ATRIAL RATE: 147 BPM
BASOPHILS # BLD AUTO: 0.03 THOUSANDS/ÂΜL (ref 0–0.1)
BASOPHILS NFR BLD AUTO: 0 % (ref 0–1)
BILIRUB DIRECT SERPL-MCNC: 0.08 MG/DL (ref 0–0.2)
BILIRUB SERPL-MCNC: 0.46 MG/DL (ref 0.2–1)
BNP SERPL-MCNC: 15 PG/ML (ref 0–100)
BSA FOR ECHO PROCEDURE: 2.08 M2
BUN SERPL-MCNC: 10 MG/DL (ref 5–25)
CALCIUM SERPL-MCNC: 10.2 MG/DL (ref 8.4–10.2)
CARDIAC TROPONIN I PNL SERPL HS: <2 NG/L
CARDIAC TROPONIN I PNL SERPL HS: <2 NG/L
CHLORIDE SERPL-SCNC: 103 MMOL/L (ref 96–108)
CO2 SERPL-SCNC: 26 MMOL/L (ref 21–32)
CREAT SERPL-MCNC: 0.96 MG/DL (ref 0.6–1.3)
CRP SERPL QL: 15.9 MG/L
D DIMER PPP FEU-MCNC: 2.24 UG/ML FEU
E WAVE DECELERATION TIME: 168 MS
E/A RATIO: 0.83
EOSINOPHIL # BLD AUTO: 0.38 THOUSAND/ÂΜL (ref 0–0.61)
EOSINOPHIL NFR BLD AUTO: 4 % (ref 0–6)
ERYTHROCYTE [DISTWIDTH] IN BLOOD BY AUTOMATED COUNT: 15.7 % (ref 11.6–15.1)
ERYTHROCYTE [SEDIMENTATION RATE] IN BLOOD: 39 MM/HOUR (ref 0–29)
FRACTIONAL SHORTENING: 45 (ref 28–44)
GFR SERPL CREATININE-BSD FRML MDRD: 69 ML/MIN/1.73SQ M
GLUCOSE SERPL-MCNC: 118 MG/DL (ref 65–140)
HCT VFR BLD AUTO: 45.4 % (ref 34.8–46.1)
HGB BLD-MCNC: 14.1 G/DL (ref 11.5–15.4)
IMM GRANULOCYTES # BLD AUTO: 0.03 THOUSAND/UL (ref 0–0.2)
IMM GRANULOCYTES NFR BLD AUTO: 0 % (ref 0–2)
INTERVENTRICULAR SEPTUM IN DIASTOLE (PARASTERNAL SHORT AXIS VIEW): 1 CM
INTERVENTRICULAR SEPTUM: 1 CM (ref 0.6–1.1)
LAAS-AP2: 10.9 CM2
LAAS-AP4: 10.8 CM2
LEFT ATRIUM SIZE: 3.1 CM
LEFT ATRIUM VOLUME (MOD BIPLANE): 22 ML
LEFT ATRIUM VOLUME INDEX (MOD BIPLANE): 10.6 ML/M2
LEFT INTERNAL DIMENSION IN SYSTOLE: 2.3 CM (ref 2.1–4)
LEFT VENTRICLE DIASTOLIC VOLUME (MOD BIPLANE): 62 ML
LEFT VENTRICLE DIASTOLIC VOLUME INDEX (MOD BIPLANE): 29.8 ML/M2
LEFT VENTRICLE SYSTOLIC VOLUME (MOD BIPLANE): 27 ML
LEFT VENTRICLE SYSTOLIC VOLUME INDEX (MOD BIPLANE): 13 ML/M2
LEFT VENTRICULAR INTERNAL DIMENSION IN DIASTOLE: 4.2 CM (ref 3.5–6)
LEFT VENTRICULAR POSTERIOR WALL IN END DIASTOLE: 0.9 CM
LEFT VENTRICULAR STROKE VOLUME: 60 ML
LIPASE SERPL-CCNC: 26 U/L (ref 11–82)
LV EF: 57 %
LVSV (TEICH): 60 ML
LYMPHOCYTES # BLD AUTO: 2.51 THOUSANDS/ÂΜL (ref 0.6–4.47)
LYMPHOCYTES NFR BLD AUTO: 25 % (ref 14–44)
MCH RBC QN AUTO: 27.9 PG (ref 26.8–34.3)
MCHC RBC AUTO-ENTMCNC: 31.1 G/DL (ref 31.4–37.4)
MCV RBC AUTO: 90 FL (ref 82–98)
MONOCYTES # BLD AUTO: 0.33 THOUSAND/ÂΜL (ref 0.17–1.22)
MONOCYTES NFR BLD AUTO: 3 % (ref 4–12)
MV E'TISSUE VEL-LAT: 14 CM/S
MV E'TISSUE VEL-SEP: 7 CM/S
MV PEAK A VEL: 0.71 M/S
MV PEAK E VEL: 59 CM/S
MV STENOSIS PRESSURE HALF TIME: 49 MS
MV VALVE AREA P 1/2 METHOD: 4.49
NEUTROPHILS # BLD AUTO: 6.76 THOUSANDS/ÂΜL (ref 1.85–7.62)
NEUTS SEG NFR BLD AUTO: 68 % (ref 43–75)
NRBC BLD AUTO-RTO: 0 /100 WBCS
P AXIS: 48 DEGREES
P AXIS: 68 DEGREES
PLATELET # BLD AUTO: 412 THOUSANDS/UL (ref 149–390)
PMV BLD AUTO: 10 FL (ref 8.9–12.7)
POTASSIUM SERPL-SCNC: 3.3 MMOL/L (ref 3.5–5.3)
PR INTERVAL: 190 MS
PR INTERVAL: 192 MS
PROT SERPL-MCNC: 8.3 G/DL (ref 6.4–8.4)
QRS AXIS: 20 DEGREES
QRS AXIS: 52 DEGREES
QRS AXIS: 55 DEGREES
QRSD INTERVAL: 56 MS
QRSD INTERVAL: 62 MS
QRSD INTERVAL: 66 MS
QT INTERVAL: 306 MS
QT INTERVAL: 306 MS
QT INTERVAL: 334 MS
QTC INTERVAL: 421 MS
QTC INTERVAL: 430 MS
QTC INTERVAL: 494 MS
RBC # BLD AUTO: 5.05 MILLION/UL (ref 3.81–5.12)
RIGHT ATRIUM AREA SYSTOLE A4C: 7.7 CM2
RIGHT VENTRICLE ID DIMENSION: 3 CM
SL CV LEFT ATRIUM LENGTH A2C: 4.3 CM
SL CV LV EF: 60
SL CV PED ECHO LEFT VENTRICLE DIASTOLIC VOLUME (MOD BIPLANE) 2D: 78 ML
SL CV PED ECHO LEFT VENTRICLE SYSTOLIC VOLUME (MOD BIPLANE) 2D: 18 ML
SODIUM SERPL-SCNC: 140 MMOL/L (ref 135–147)
T WAVE AXIS: 28 DEGREES
T WAVE AXIS: 30 DEGREES
T WAVE AXIS: 31 DEGREES
TRICUSPID ANNULAR PLANE SYSTOLIC EXCURSION: 2.4 CM
TSH SERPL DL<=0.05 MIU/L-ACNC: 2.05 UIU/ML (ref 0.45–4.5)
VENTRICULAR RATE: 100 BPM
VENTRICULAR RATE: 114 BPM
VENTRICULAR RATE: 157 BPM
WBC # BLD AUTO: 10.04 THOUSAND/UL (ref 4.31–10.16)

## 2024-04-15 PROCEDURE — 93005 ELECTROCARDIOGRAM TRACING: CPT

## 2024-04-15 PROCEDURE — 83690 ASSAY OF LIPASE: CPT | Performed by: STUDENT IN AN ORGANIZED HEALTH CARE EDUCATION/TRAINING PROGRAM

## 2024-04-15 PROCEDURE — 96374 THER/PROPH/DIAG INJ IV PUSH: CPT

## 2024-04-15 PROCEDURE — 84443 ASSAY THYROID STIM HORMONE: CPT | Performed by: INTERNAL MEDICINE

## 2024-04-15 PROCEDURE — 99285 EMERGENCY DEPT VISIT HI MDM: CPT | Performed by: STUDENT IN AN ORGANIZED HEALTH CARE EDUCATION/TRAINING PROGRAM

## 2024-04-15 PROCEDURE — 85652 RBC SED RATE AUTOMATED: CPT | Performed by: EMERGENCY MEDICINE

## 2024-04-15 PROCEDURE — 80048 BASIC METABOLIC PNL TOTAL CA: CPT | Performed by: STUDENT IN AN ORGANIZED HEALTH CARE EDUCATION/TRAINING PROGRAM

## 2024-04-15 PROCEDURE — 71275 CT ANGIOGRAPHY CHEST: CPT

## 2024-04-15 PROCEDURE — 93306 TTE W/DOPPLER COMPLETE: CPT

## 2024-04-15 PROCEDURE — 80076 HEPATIC FUNCTION PANEL: CPT | Performed by: STUDENT IN AN ORGANIZED HEALTH CARE EDUCATION/TRAINING PROGRAM

## 2024-04-15 PROCEDURE — 96361 HYDRATE IV INFUSION ADD-ON: CPT

## 2024-04-15 PROCEDURE — 85025 COMPLETE CBC W/AUTO DIFF WBC: CPT | Performed by: STUDENT IN AN ORGANIZED HEALTH CARE EDUCATION/TRAINING PROGRAM

## 2024-04-15 PROCEDURE — 36415 COLL VENOUS BLD VENIPUNCTURE: CPT | Performed by: STUDENT IN AN ORGANIZED HEALTH CARE EDUCATION/TRAINING PROGRAM

## 2024-04-15 PROCEDURE — 99285 EMERGENCY DEPT VISIT HI MDM: CPT

## 2024-04-15 PROCEDURE — 86140 C-REACTIVE PROTEIN: CPT | Performed by: INTERNAL MEDICINE

## 2024-04-15 PROCEDURE — 84484 ASSAY OF TROPONIN QUANT: CPT | Performed by: STUDENT IN AN ORGANIZED HEALTH CARE EDUCATION/TRAINING PROGRAM

## 2024-04-15 PROCEDURE — 83880 ASSAY OF NATRIURETIC PEPTIDE: CPT | Performed by: EMERGENCY MEDICINE

## 2024-04-15 PROCEDURE — 71045 X-RAY EXAM CHEST 1 VIEW: CPT

## 2024-04-15 PROCEDURE — 96375 TX/PRO/DX INJ NEW DRUG ADDON: CPT

## 2024-04-15 PROCEDURE — 85379 FIBRIN DEGRADATION QUANT: CPT | Performed by: STUDENT IN AN ORGANIZED HEALTH CARE EDUCATION/TRAINING PROGRAM

## 2024-04-15 RX ORDER — COLCHICINE 0.6 MG/1
0.6 TABLET ORAL 2 TIMES DAILY
Qty: 60 TABLET | Refills: 0 | Status: SHIPPED | OUTPATIENT
Start: 2024-04-15 | End: 2024-05-15

## 2024-04-15 RX ORDER — IODIXANOL 320 MG/ML
85 INJECTION, SOLUTION INTRAVASCULAR
Status: COMPLETED | OUTPATIENT
Start: 2024-04-15 | End: 2024-04-15

## 2024-04-15 RX ORDER — METHYLPREDNISOLONE SODIUM SUCCINATE 125 MG/2ML
125 INJECTION, POWDER, LYOPHILIZED, FOR SOLUTION INTRAMUSCULAR; INTRAVENOUS ONCE
Status: COMPLETED | OUTPATIENT
Start: 2024-04-15 | End: 2024-04-15

## 2024-04-15 RX ORDER — KETOROLAC TROMETHAMINE 30 MG/ML
15 INJECTION, SOLUTION INTRAMUSCULAR; INTRAVENOUS ONCE
Status: COMPLETED | OUTPATIENT
Start: 2024-04-15 | End: 2024-04-15

## 2024-04-15 RX ORDER — DIPHENHYDRAMINE HYDROCHLORIDE 50 MG/ML
25 INJECTION INTRAMUSCULAR; INTRAVENOUS ONCE
Status: COMPLETED | OUTPATIENT
Start: 2024-04-15 | End: 2024-04-15

## 2024-04-15 RX ADMIN — SODIUM CHLORIDE 1000 ML: 0.9 INJECTION, SOLUTION INTRAVENOUS at 06:52

## 2024-04-15 RX ADMIN — DIPHENHYDRAMINE HYDROCHLORIDE 25 MG: 50 INJECTION, SOLUTION INTRAMUSCULAR; INTRAVENOUS at 06:43

## 2024-04-15 RX ADMIN — IODIXANOL 85 ML: 320 INJECTION, SOLUTION INTRAVASCULAR at 07:05

## 2024-04-15 RX ADMIN — METHYLPREDNISOLONE SODIUM SUCCINATE 125 MG: 125 INJECTION, POWDER, FOR SOLUTION INTRAMUSCULAR; INTRAVENOUS at 06:43

## 2024-04-15 RX ADMIN — KETOROLAC TROMETHAMINE 15 MG: 30 INJECTION, SOLUTION INTRAMUSCULAR; INTRAVENOUS at 05:38

## 2024-04-15 NOTE — ED CARE HANDOFF
Emergency Department Sign Out Note        Sign out and transfer of care from Dr. Steele. See Separate Emergency Department note.     The patient, Nanette Kim, was evaluated by the previous provider for chest pain.    Workup Completed:  CBC, EKG, CXR unremarkable.    ED Course / Workup Pending (followup):        First troponin less than 2.  D-dimer elevated.  Will give Solu-Medrol and Benadryl for PE study.  EKG #2 shows a normal sinus rhythm with a rate of 100.  No acute ST changes noted.  Unchanged from previous EKG.    Case was discussed with cardiology.  Echocardiogram shows moderate pericardial effusion.  Most likely pericarditis.  Recommend starting on colchicine 0.6 mg twice daily.  Patient also made aware of lung nodule.                            ED Course as of 04/15/24 0959   Mon Apr 15, 2024   0811 Discussed with cardiology on-call, Dr. Baca recommends sed rate and echocardiogram   0954 .  Cardiology notes moderate effusion.  Recommends outpatient follow-up.  Probable pericarditis.  Start with colchicine 0.6 mg twice daily for 1 month.  Patient also made aware of lung nodule need for follow-up.     Procedures  Medical Decision Making  Amount and/or Complexity of Data Reviewed  Labs: ordered.  Radiology: ordered and independent interpretation performed.    Risk  Prescription drug management.            Disposition  Final diagnoses:   Atypical chest pain   Pericarditis   Lung nodule     Time reflects when diagnosis was documented in both MDM as applicable and the Disposition within this note       Time User Action Codes Description Comment    4/15/2024  6:35 AM Eduardo Chaudhry Add [R07.89] Atypical chest pain     4/15/2024  9:56 AM Eduardo Chaudhry Add [I31.9] Pericarditis     4/15/2024  9:58 AM Eduardo Chaudhry Add [R91.1] Lung nodule           ED Disposition       ED Disposition   Discharge    Condition   Stable    Date/Time   Mon Apr 15, 2024  9:56 AM    Comment   Nanette Kim  discharge to home/self care.                   Follow-up Information       Follow up With Specialties Details Why Contact Info    Izzy Quiñones DO Family Medicine Call today  [address] [place] [address] [phone]      Porfirio Baca MD Cardiology Call in 2 days  [address] [facility] [place] [address] [phone]            Patient's Medications   Discharge Prescriptions    COLCHICINE (COLCRYS) 0.6 MG TABLET    Take 1 tablet (0.6 mg total) by mouth 2 (two) times a day       Start Date: 4/15/2024 End Date: 5/15/2024       Order Dose: 0.6 mg       Quantity: 60 tablet    Refills: 0            ED Provider  Electronically Signed by     Eduardo Chaudhry MD  04/15/24 0959

## 2024-04-15 NOTE — ED PROVIDER NOTES
History  Chief Complaint   Patient presents with    Chest Pain     Pt woke from sleep with chest pain at 430. Denies N/V, HA, or diaphoresis.        History provided by:  Patient  Chest Pain  Pain location:  L chest, R chest, R lateral chest and L lateral chest  Pain quality: sharp and stabbing    Pain radiates to the back: yes    Pain severity:  Moderate  Onset quality:  Sudden  Duration:  1 hour  Timing:  Constant  Progression:  Unchanged  Chronicity:  New  Context comment:  Woke up with bilateral anterior/lateral chest pain. Denies retrosternal chest discomfort. Having SOB due to pain. Denies recent injury/cough.  Relieved by:  None tried  Worsened by:  Movement, certain positions and deep breathing  Associated symptoms: anxiety, back pain and shortness of breath    Associated symptoms: no abdominal pain, no cough, no diaphoresis, no dizziness, no fatigue, no headache, no nausea, no palpitations, not vomiting and no weakness      Prior to Admission Medications   Prescriptions Last Dose Informant Patient Reported? Taking?   ALPRAZolam (XANAX) 0.25 mg tablet   Yes No   Sig: Take 0.25 mg by mouth 2 (two) times a day as needed for anxiety   Cholecalciferol (Vitamin D3) 125 MCG (5000 UT) TABS   Yes No   Sig: Take 5,000 Units by mouth daily   Cyanocobalamin (VITAMIN B-12 PO)   Yes No   Sig: Take 1 tablet by mouth in the morning   DULoxetine (CYMBALTA) 20 mg capsule   Yes No   Sig: Take 20 mg by mouth 2 (two) times a day   Omega-3 Fatty Acids (FISH OIL PO)   Yes No   Sig: Take 1 capsule by mouth in the morning   acetaminophen (TYLENOL) 500 mg tablet   Yes No   Sig: Take 1,000 mg by mouth every 6 (six) hours as needed for mild pain   ascorbic acid (VITAMIN C) 500 MG tablet   Yes No   Sig: Take 500 mg by mouth daily   aspirin-acetaminophen-caffeine (EXCEDRIN MIGRAINE) 250-250-65 MG per tablet   Yes No   Sig: Take 1 tablet by mouth every 6 (six) hours as needed for headaches   diphenhydrAMINE (BENADRYL) 25 mg capsule   Yes  No   Sig: Take 25 mg by mouth every 6 (six) hours as needed for itching   fexofenadine (ALLEGRA) 180 MG tablet   Yes No   Sig: Take 180 mg by mouth daily   fluticasone (FLONASE) 50 mcg/act nasal spray   Yes No   Si spray into each nostril 2 (two) times a day   levothyroxine 112 mcg tablet   Yes No   Sig: Take 112 mcg by mouth daily   metoprolol succinate (TOPROL-XL) 50 mg 24 hr tablet   Yes No   Sig: Take 50 mg by mouth daily   omeprazole (PriLOSEC) 20 mg delayed release capsule   Yes No   Sig: Take 20 mg by mouth daily   pravastatin (PRAVACHOL) 10 mg tablet   Yes No   Sig: Take 10 mg by mouth daily      Facility-Administered Medications: None       Past Medical History:   Diagnosis Date    Abnormal uterine bleeding (AUB)     Anal fissure     Anxiety     Arthritis     right shoulder    Chronic headaches     Colon polyp     Depression     GERD (gastroesophageal reflux disease)     H/O chronic sinusitis     Hyperlipidemia     Palpitations     Pt is on metoprolol    Seasonal allergies     SVT (supraventricular tachycardia)        Past Surgical History:   Procedure Laterality Date    CARPAL TUNNEL RELEASE Bilateral     CERVICAL BIOPSY  W/ LOOP ELECTRODE EXCISION      COLONOSCOPY      COLPOSCOPY      DILATION AND CURETTAGE OF UTERUS      CT HYSTEROSCOPY BX ENDOMETRIUM&/POLYPC W/WO D&C N/A 2023    Procedure: DILATION AND CURETTAGE, EUA;  Surgeon: Julius Marin Jr., DO;  Location:  MAIN OR;  Service: Gynecology    SINUS SURGERY      times 2       Family History   Problem Relation Age of Onset    Breast cancer Mother 69    Skin cancer Mother     Colon cancer Father     Lung cancer Father     No Known Problems Maternal Grandmother     Melanoma Maternal Grandfather     Cancer Paternal Grandmother     No Known Problems Paternal Grandfather     No Known Problems Paternal Aunt     Breast cancer additional onset Neg Hx     Ovarian cancer Neg Hx     Endometrial cancer Neg Hx     BRCA 1/2 Neg Hx     BRCA1 Positive  Neg Hx     BRCA2 Positive Neg Hx     BRCA1 Negative Neg Hx     BRCA2 Negative Neg Hx      I have reviewed and agree with the history as documented.    E-Cigarette/Vaping    E-Cigarette Use Never User      E-Cigarette/Vaping Substances     Social History     Tobacco Use    Smoking status: Never    Smokeless tobacco: Never   Vaping Use    Vaping status: Never Used   Substance Use Topics    Alcohol use: Yes     Comment: socially    Drug use: Never       Review of Systems   Constitutional:  Negative for diaphoresis and fatigue.   HENT:  Negative for congestion, rhinorrhea, sinus pressure and sinus pain.    Respiratory:  Positive for shortness of breath. Negative for cough, chest tightness and wheezing.    Cardiovascular:  Positive for chest pain. Negative for palpitations and leg swelling.   Gastrointestinal:  Negative for abdominal pain, nausea and vomiting.   Musculoskeletal:  Positive for back pain. Negative for arthralgias.   Skin:  Negative for color change, pallor, rash and wound.   Neurological:  Negative for dizziness, syncope, weakness, light-headedness and headaches.   All other systems reviewed and are negative.      Physical Exam  Physical Exam  Vitals and nursing note reviewed.   Constitutional:       General: She is in acute distress.      Appearance: She is not ill-appearing or toxic-appearing.   Cardiovascular:      Rate and Rhythm: Regular rhythm. Tachycardia present.      Heart sounds: Normal heart sounds. No murmur heard.  Pulmonary:      Effort: Pulmonary effort is normal. No tachypnea, accessory muscle usage or respiratory distress.      Breath sounds: Normal breath sounds. No stridor. No decreased breath sounds, wheezing, rhonchi or rales.   Chest:      Chest wall: Tenderness present.   Abdominal:      General: Bowel sounds are normal.      Palpations: Abdomen is soft.      Tenderness: There is no abdominal tenderness. There is no guarding or rebound.   Musculoskeletal:      Cervical back: Neck  supple.      Right lower leg: No tenderness. No edema.      Left lower leg: No tenderness. No edema.   Skin:     General: Skin is warm and dry.      Coloration: Skin is not cyanotic.      Findings: No ecchymosis or erythema.   Neurological:      General: No focal deficit present.      Mental Status: She is alert and oriented to person, place, and time.      Cranial Nerves: No cranial nerve deficit.      Motor: No weakness.   Psychiatric:         Mood and Affect: Mood is anxious.       Vital Signs  ED Triage Vitals [04/15/24 0531]   Temperature Pulse Respirations Blood Pressure SpO2   98.1 °F (36.7 °C) (!) 138 22 165/97 95 %      Temp src Heart Rate Source Patient Position - Orthostatic VS BP Location FiO2 (%)   -- Monitor Lying Left arm --      Pain Score       10 - Worst Possible Pain           Vitals:    04/15/24 0531   BP: 165/97   Pulse: (!) 138   Patient Position - Orthostatic VS: Lying         Visual Acuity      ED Medications  Medications   ketorolac (TORADOL) injection 15 mg (15 mg Intravenous Given 4/15/24 0538)       Diagnostic Studies  Results Reviewed       Procedure Component Value Units Date/Time    HS Troponin 0hr (reflex protocol) [[id number]] Collected: 04/15/24 0537    Lab Status: In process Specimen: Blood from Arm, Right Updated: 04/15/24 0553    CBC and differential [[id number]]  (Abnormal) Collected: 04/15/24 0537    Lab Status: Final result Specimen: Blood from Arm, Right Updated: 04/15/24 0548     WBC 10.04 Thousand/uL      RBC 5.05 Million/uL      Hemoglobin 14.1 g/dL      Hematocrit 45.4 %      MCV 90 fL      MCH 27.9 pg      MCHC 31.1 g/dL      RDW 15.7 %      MPV 10.0 fL      Platelets 412 Thousands/uL      nRBC 0 /100 WBCs      Segmented % 68 %      Immature Grans % 0 %      Lymphocytes % 25 %      Monocytes % 3 %      Eosinophils Relative 4 %      Basophils Relative 0 %      Absolute Neutrophils 6.76 Thousands/µL      Absolute Immature Grans 0.03 Thousand/uL      Absolute Lymphocytes 2.51  Thousands/µL      Absolute Monocytes 0.33 Thousand/µL      Eosinophils Absolute 0.38 Thousand/µL      Basophils Absolute 0.03 Thousands/µL     Basic metabolic panel [[id number]] Collected: 04/15/24 0537    Lab Status: In process Specimen: Blood from Arm, Right Updated: 04/15/24 0545    Hepatic function panel [[id number]] Collected: 04/15/24 0537    Lab Status: In process Specimen: Blood from Arm, Right Updated: 04/15/24 0545    Lipase [[id number]] Collected: 04/15/24 0537    Lab Status: In process Specimen: Blood from Arm, Right Updated: 04/15/24 0545    D-dimer, quantitative [[id number]] Collected: 04/15/24 0543    Lab Status: In process Specimen: Blood from Arm, Right Updated: 04/15/24 0545                   XR chest 1 view portable   ED Interpretation by Carlos Steele DO (04/15 0551)   No acute findings.       Final Result by Bebeto Lara MD (04/15 0604)      No acute cardiopulmonary disease.      Findings are stable      Workstation performed: [id number]                    Procedures  ECG 12 Lead Documentation Only    Date/Time: 4/15/2024 5:33 AM    Performed by: Carlos Steele DO  Authorized by: Carlos Steele DO    Indications / Diagnosis:  Chest pain  ECG reviewed by me, the ED Provider: yes    Patient location:  ED  Interpretation:     Interpretation: non-specific    Rate:     ECG rate:  114    ECG rate assessment: tachycardic    Rhythm:     Rhythm: sinus tachycardia    QRS:     QRS axis:  Normal    QRS intervals:  Normal  ST segments:     ST segments:  Normal  T waves:     T waves: normal      ED Course  ED Course as of 04/15/24 0606   Mon Apr 15, 2024   0537 Vital signs reviewed. Tachycardic upon arrival. Expressing pain under the breasts. +reproducible chest/rib pain. Denies retrosternal chest discomfort. Will obtain basic labs, CXR. Will administer a dose of IV Toradol. The patient states that she has an allergy to IV contrast--during a CT w/ IV contrast study in the early 2000s, the  patient developed a rash along her back. Hasn't received contrast since.    0539 ECG interpreted by me.  Sinus tachycardia.  Heart rate 114 bpm.  Normal axis.  No acute ischemic changes.   0551 Chest x-ray interpreted by me.  No acute findings.   0605 Upon re-evaluation, the patient's chest discomfort is improved s/p Toradol. W/u is pending. Signed out to Dr. Chaudhry. Plan discussed.                                SBIRT 20yo+      Flowsheet Row Most Recent Value   Initial Alcohol Screen: US AUDIT-C     1. How often do you have a drink containing alcohol? 0 Filed at: 04/15/2024 0545   2. How many drinks containing alcohol do you have on a typical day you are drinking?  0 Filed at: 04/15/2024 0545   3b. FEMALE Any Age, or MALE 65+: How often do you have 4 or more drinks on one occassion? 0 Filed at: 04/15/2024 0545   Audit-C Score 0 Filed at: 04/15/2024 0545   HERB: How many times in the past year have you...    Used an illegal drug or used a prescription medication for non-medical reasons? Never Filed at: 04/15/2024 0545                      Medical Decision Making  This patient presents with chest pain.   Diagnostic considerations include PE, aortic dissection, ACS, costochondritis. See ED Course.         Amount and/or Complexity of Data Reviewed  Labs: ordered.  Radiology: ordered and independent interpretation performed. Decision-making details documented in ED Course.  ECG/medicine tests: ordered and independent interpretation performed. Decision-making details documented in ED Course.    Risk  Prescription drug management.             Disposition  Final diagnoses:   None     ED Disposition       None          Follow-up Information    None         Patient's Medications   Discharge Prescriptions    No medications on file       No discharge procedures on file.    PDMP Review       None            ED Provider  Electronically Signed by             Carlos Steele DO  04/15/24 0607

## 2024-04-15 NOTE — DISCHARGE INSTRUCTIONS
The CAT scan showed a nodule in the left upper part of your lung.  You will need to follow-up with your family doctor.

## 2024-06-04 ENCOUNTER — TELEPHONE (OUTPATIENT)
Age: 49
End: 2024-06-04

## 2024-06-04 NOTE — TELEPHONE ENCOUNTER
Caller: Nanette    Doctor: ?    Reason for call: Pt is trying to find out if there is a DR in [place] that can insert a heart drain? She had to be medivac'd to [place] with [facility] to drain since they could not take care of it at the [facility] in her area and was told no Dr can insert this... Please call to let her know if we can offer any Dr's or options.  She is thoroughly disgusted w [facility].    Call back#: [phone]

## 2024-10-24 ENCOUNTER — TELEPHONE (OUTPATIENT)
Age: 49
End: 2024-10-24

## 2024-10-24 NOTE — TELEPHONE ENCOUNTER
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 Pt was looking for Nephrologist's in [place] which we do not have and stated that she prefers to stay local. I informed pt of the locations we have and the next available. Pt said it was too far out and will look into [facility].

## 2025-03-10 ENCOUNTER — HOSPITAL ENCOUNTER (OUTPATIENT)
Dept: RADIOLOGY | Facility: CLINIC | Age: 50
Discharge: HOME | End: 2025-03-10
Payer: COMMERCIAL

## 2025-03-10 VITALS — BODY MASS INDEX: 36.82 KG/M2 | HEIGHT: 65 IN | WEIGHT: 221 LBS

## 2025-03-10 DIAGNOSIS — Z12.31 ENCOUNTER FOR SCREENING MAMMOGRAM FOR BREAST CANCER: ICD-10-CM

## 2025-03-10 PROCEDURE — 77063 BREAST TOMOSYNTHESIS BI: CPT

## 2025-03-10 PROCEDURE — 77067 SCR MAMMO BI INCL CAD: CPT

## (undated) DEVICE — ARTHROSCOPY FLOOR MAT

## (undated) DEVICE — MAYO STAND COVER: Brand: CONVERTORS

## (undated) DEVICE — STERILE SURGICAL LUBRICANT,  TUBE: Brand: SURGILUBE

## (undated) DEVICE — DRAPE EQUIPMENT RF WAND

## (undated) DEVICE — BASIC SINGLE BASIN 2-LF: Brand: MEDLINE INDUSTRIES, INC.

## (undated) DEVICE — TISSUE REMOVAL SYSTEM FLUID MANAGEMENT ACCESSORIES: Brand: SYMPHION

## (undated) DEVICE — THREE-QUARTER SHEET: Brand: CONVERTORS

## (undated) DEVICE — PREMIUM DRY TRAY LF: Brand: MEDLINE INDUSTRIES, INC.

## (undated) DEVICE — GLOVE INDICATOR PI UNDERGLOVE SZ 7.5 BLUE

## (undated) DEVICE — CATH URET 12FR RED RUBBER

## (undated) DEVICE — 4-PORT MANIFOLD: Brand: NEPTUNE 2

## (undated) DEVICE — STRL ALLENTOWN HYSTEROSCOPY PK: Brand: CARDINAL HEALTH

## (undated) DEVICE — MAXI PAD5.51 X 13.78 IN. (14.0 X 35.0 CM)HEAVYCONTOUREDUNSCENTED: Brand: CURITY

## (undated) DEVICE — CHLORHEXIDINE 4PCT 4 OZ

## (undated) DEVICE — SUT VICRYL 3-0 SH 27 IN J416H

## (undated) DEVICE — DRAPE,UNDERBUTTOCKS,PCH,STERILE: Brand: MEDLINE

## (undated) DEVICE — GAUZE SPONGES,16 PLY: Brand: CURITY